# Patient Record
Sex: MALE | Race: WHITE | Employment: FULL TIME
[De-identification: names, ages, dates, MRNs, and addresses within clinical notes are randomized per-mention and may not be internally consistent; named-entity substitution may affect disease eponyms.]

---

## 2017-02-08 ENCOUNTER — TELEPHONE (OUTPATIENT)
Dept: FAMILY MEDICINE CLINIC | Facility: CLINIC | Age: 42
End: 2017-02-08

## 2017-02-08 RX ORDER — OSELTAMIVIR PHOSPHATE 75 MG/1
75 CAPSULE ORAL DAILY
Qty: 10 CAPSULE | Refills: 0 | Status: SHIPPED | OUTPATIENT
Start: 2017-02-08 | End: 2017-02-18

## 2019-05-29 ENCOUNTER — OFFICE VISIT (OUTPATIENT)
Dept: FAMILY MEDICINE CLINIC | Age: 44
End: 2019-05-29
Payer: COMMERCIAL

## 2019-05-29 VITALS
OXYGEN SATURATION: 96 % | TEMPERATURE: 99 F | SYSTOLIC BLOOD PRESSURE: 126 MMHG | BODY MASS INDEX: 25.87 KG/M2 | WEIGHT: 191 LBS | HEIGHT: 72 IN | DIASTOLIC BLOOD PRESSURE: 80 MMHG | HEART RATE: 88 BPM

## 2019-05-29 DIAGNOSIS — H65.92 LEFT NON-SUPPURATIVE OTITIS MEDIA: Primary | ICD-10-CM

## 2019-05-29 PROCEDURE — 99213 OFFICE O/P EST LOW 20 MIN: CPT | Performed by: FAMILY MEDICINE

## 2019-05-29 RX ORDER — CEPHALEXIN 500 MG/1
500 CAPSULE ORAL 3 TIMES DAILY
Qty: 30 CAPSULE | Refills: 0 | Status: SHIPPED | OUTPATIENT
Start: 2019-05-29 | End: 2019-06-08

## 2019-05-29 ASSESSMENT — ENCOUNTER SYMPTOMS
SORE THROAT: 1
SINUS PRESSURE: 1
COUGH: 1
RHINORRHEA: 1

## 2019-05-29 NOTE — PATIENT INSTRUCTIONS
SURVEY:    You may be receiving a survey from [employer] regarding your visit today. Please complete the survey to enable us to provide the highest quality of care to you and your family. If you cannot score us a very good on any question, please call the office to discuss how we could have made your experience a very good one. Thank you.

## 2019-05-29 NOTE — PROGRESS NOTES
Cardiovascular: Normal rate and regular rhythm. Pulmonary/Chest: Effort normal and breath sounds normal. He has no rales. Lymphadenopathy:     He has no cervical adenopathy. Neurological: He is alert and oriented to person, place, and time. Skin: Skin is warm and dry. Nursing note and vitals reviewed.        /80   Pulse 88   Temp 99 °F (37.2 °C) (Oral)   Ht 6' (1.829 m)   Wt 191 lb (86.6 kg)   SpO2 96%   BMI 25.90 kg/m²     RECENT LABS:  Lab Results   Component Value Date     06/27/2014    K 4.1 06/27/2014     06/27/2014    CO2 28 06/27/2014    BUN 15 06/27/2014    CREATININE 0.91 06/27/2014    GLUCOSE 94 06/27/2014    PROT 7.1 06/27/2014    LABALBU 4.4 06/27/2014    BILITOT 1.72 06/27/2014    ALKPHOS 69 06/27/2014    AST 19 06/27/2014    ALT 25 06/27/2014     No results found for: WBC, RBC, HGB, HCT, MCV, MCH, MCHC, RDW, PLT, MPV  No results found for: TSH  Lab Results   Component Value Date    CHOL 210 06/27/2014    HDL 63 06/27/2014       Assessment & Plan:  Left otitis media    Cephalexin 500 tid for 10 days    Electronically signed by Emerald Tobar DO on 5/29/2019 at 3:59 PM

## 2020-11-30 ENCOUNTER — TELEPHONE (OUTPATIENT)
Dept: FAMILY MEDICINE CLINIC | Age: 45
End: 2020-11-30

## 2020-11-30 ENCOUNTER — HOSPITAL ENCOUNTER (OUTPATIENT)
Dept: PREADMISSION TESTING | Age: 45
Setting detail: SPECIMEN
Discharge: HOME | End: 2020-11-30
Payer: COMMERCIAL

## 2020-11-30 LAB
SARS-COV-2, RAPID: DETECTED
SARS-COV-2: ABNORMAL
SARS-COV-2: ABNORMAL
SOURCE: ABNORMAL

## 2020-11-30 PROCEDURE — C9803 HOPD COVID-19 SPEC COLLECT: HCPCS

## 2020-11-30 PROCEDURE — U0002 COVID-19 LAB TEST NON-CDC: HCPCS

## 2020-11-30 NOTE — TELEPHONE ENCOUNTER
Sx started Friday, wife is positive Covid,  Head cold, loss of smell, headache. Would like a rapid test for confirmation due being around people at [facility]. Last visit:  5/29/2019  Next Visit Date:  No future appointments. Last Med refill:    Medication List:  Prior to Admission medications    Medication Sig Start Date End Date Taking? Authorizing Provider   fluticasone (FLONASE) 50 MCG/ACT nasal spray 2 sprays by Nasal route daily 5/24/16   Ashly Real A Jump, DO   ibuprofen (ADVIL;MOTRIN) 200 MG tablet Take 200 mg by mouth every 6 hours as needed for Pain. Historical Provider, MD       Allergies:  Patient has no known allergies.     No results found for: LABA1C          ( goal A1C is < 7)   No results found for: LABMICR  LDL Cholesterol (mg/dL)   Date Value   06/27/2014 136 (H)       (goal LDL is <100)   AST (U/L)   Date Value   06/27/2014 19     ALT (U/L)   Date Value   06/27/2014 25     BUN (mg/dL)   Date Value   06/27/2014 15     BP Readings from Last 3 Encounters:   05/29/19 126/80   05/24/16 122/70   09/30/14 118/70          (goal 120/80)

## 2020-12-01 ENCOUNTER — TELEPHONE (OUTPATIENT)
Dept: ADMINISTRATIVE | Age: 45
End: 2020-12-01

## 2021-02-15 ENCOUNTER — HOSPITAL ENCOUNTER (OUTPATIENT)
Dept: GENERAL RADIOLOGY | Age: 46
Discharge: HOME | End: 2021-02-17
Payer: COMMERCIAL

## 2021-02-15 ENCOUNTER — OFFICE VISIT (OUTPATIENT)
Dept: FAMILY MEDICINE CLINIC | Age: 46
End: 2021-02-15
Payer: COMMERCIAL

## 2021-02-15 ENCOUNTER — HOSPITAL ENCOUNTER (OUTPATIENT)
Age: 46
Discharge: HOME | End: 2021-02-17
Payer: COMMERCIAL

## 2021-02-15 VITALS
OXYGEN SATURATION: 98 % | WEIGHT: 192 LBS | TEMPERATURE: 97.8 F | DIASTOLIC BLOOD PRESSURE: 80 MMHG | HEART RATE: 82 BPM | SYSTOLIC BLOOD PRESSURE: 126 MMHG | HEIGHT: 72 IN | BODY MASS INDEX: 26.01 KG/M2

## 2021-02-15 DIAGNOSIS — M54.16 ACUTE LUMBAR RADICULOPATHY: ICD-10-CM

## 2021-02-15 DIAGNOSIS — M54.16 ACUTE LUMBAR RADICULOPATHY: Primary | ICD-10-CM

## 2021-02-15 PROCEDURE — 99213 OFFICE O/P EST LOW 20 MIN: CPT | Performed by: FAMILY MEDICINE

## 2021-02-15 PROCEDURE — 72110 X-RAY EXAM L-2 SPINE 4/>VWS: CPT

## 2021-02-15 RX ORDER — PREDNISONE 20 MG/1
40 TABLET ORAL DAILY
Qty: 10 TABLET | Refills: 0 | Status: SHIPPED | OUTPATIENT
Start: 2021-02-15 | End: 2021-02-20

## 2021-02-15 SDOH — ECONOMIC STABILITY: TRANSPORTATION INSECURITY
IN THE PAST 12 MONTHS, HAS LACK OF TRANSPORTATION KEPT YOU FROM MEETINGS, WORK, OR FROM GETTING THINGS NEEDED FOR DAILY LIVING?: NOT ASKED

## 2021-02-15 SDOH — ECONOMIC STABILITY: FOOD INSECURITY: WITHIN THE PAST 12 MONTHS, YOU WORRIED THAT YOUR FOOD WOULD RUN OUT BEFORE YOU GOT MONEY TO BUY MORE.: NEVER TRUE

## 2021-02-15 SDOH — ECONOMIC STABILITY: INCOME INSECURITY: HOW HARD IS IT FOR YOU TO PAY FOR THE VERY BASICS LIKE FOOD, HOUSING, MEDICAL CARE, AND HEATING?: NOT HARD AT ALL

## 2021-02-15 ASSESSMENT — PATIENT HEALTH QUESTIONNAIRE - PHQ9
SUM OF ALL RESPONSES TO PHQ QUESTIONS 1-9: 0
1. LITTLE INTEREST OR PLEASURE IN DOING THINGS: 0
SUM OF ALL RESPONSES TO PHQ QUESTIONS 1-9: 0

## 2021-02-15 NOTE — PROGRESS NOTES
Name: Toro Montesinos  : 1975         Chief Complaint:     Chief Complaint   Patient presents with    Lower Back Pain     radiates through right buttock, down back of right leg, tingling and numbness in toes. states that he shoveled snow last week. pain worse today. using tylenol and ibuprofen prn       History of Present Illness:      Toro Montesinos is a 39 y.o.  male who presents with Lower Back Pain (radiates through right buttock, down back of right leg, tingling and numbness in toes. states that he shoveled snow last week. pain worse today. using tylenol and ibuprofen prn)      HPI    Patient complains of pain in the right low back radiating into the buttock and posterior right lower extremity. He has had several episodes of this over the years, but this is the worst that he has had. No major injury but does recall that he tweaked his back in some way a few days ago. Pain became a lot worse this morning. He had to leave work essentially right after he had gotten there. Pain is worse with sitting and better with standing. He did have some tingling in the right toes which was short-lived. No difficulty ambulating and pain is not worse with ambulation. No weakness. He took ibuprofen 200 mg this morning and then Tylenol about an hour ago. Medical History:     Patient Active Problem List   Diagnosis   (none) - all problems resolved or deleted       Medications:       Prior to Admission medications    Medication Sig Start Date End Date Taking? Authorizing Provider   predniSONE (DELTASONE) 20 MG tablet Take 2 tablets by mouth daily for 5 days 2/15/21 2/20/21 Yes Lulu Richardson, DO   fluticasone (FLONASE) 50 MCG/ACT nasal spray 2 sprays by Nasal route daily 16   [address] A Jump, DO   ibuprofen (ADVIL;MOTRIN) 200 MG tablet Take 200 mg by mouth every 6 hours as needed for Pain. Historical Provider, MD        Allergies:       Patient has no known allergies.     Review ofSystems: Positive and Negative as described in HPI    Review of Systems   Constitutional: Negative. Gastrointestinal: Negative. Genitourinary: Negative. Physical Exam:     Vitals:  /80   Pulse 82   Temp 97.8 °F (36.6 °C)   Ht 6' (1.829 m)   Wt 192 lb (87.1 kg)   SpO2 98%   BMI 26.04 kg/m²   Physical Exam  Constitutional:       Appearance: Normal appearance. He is not ill-appearing. Musculoskeletal:      Comments: Decreased lumbar lordosis. Standing throughout visit and appears uncomfortable. No tenderness to palpation in the lumbar spine, paraspinals, or right piriformis. Positive straight leg raise bilaterally. 5/5 strength in the lower extremities. Neurological:      Mental Status: He is alert. Assessment & Plan:        Diagnosis Orders   1. Acute lumbar radiculopathy  XR LUMBAR SPINE (MIN 4 VIEWS)   Patient has had similar intermittent pain over the years. Unfortunately suspect he may have herniated a disc, specifically L5-S1. Obtaining x-ray, prescribed prednisone course, and advised gentle exercises as below as tolerated. Follow-up if not improving and may need PT and/or MRI. Requested Prescriptions     Signed Prescriptions Disp Refills    predniSONE (DELTASONE) 20 MG tablet 10 tablet 0     Sig: Take 2 tablets by mouth daily for 5 days         Patient Instructions     SURVEY:    You may be receiving a survey from [employer] regarding your visit today. You may get this in the mail, through your MyChart or in your email. Please complete the survey to enable us to provide the highest quality of care to you and your family. If you cannot score us as very good ( 5 Stars) on any question, please feel free to call the office to discuss how we could have made your experience exceptional.     Thank you.     Clinical Care Team:  Dr. James Quezada, [address] [place] [address], [address] [place] [address] Team:  Lillie Yi [facility]    Patient Education        Sciatica: Exercises  Introduction  Here are some examples of typical rehabilitation exercises for your condition. Start each exercise slowly. Ease off the exercise if you start to have pain. Your doctor or physical therapist will tell you when you can start these exercises and which ones will work best for you. When you are not being active, find a comfortable position for rest. Some people are comfortable on the floor or a medium-firm bed with a small pillow under their head and another under their knees. Some people prefer to lie on their side with a pillow between their knees. Don't stay in one position for too long. Take short walks (10 to 20 minutes) every 2 to 3 hours. Avoid slopes, hills, and stairs until you feel better. Walk only distances you can manage without pain, especially leg pain. How to do the exercises  Back stretches   1. Get down on your hands and knees on the floor. 2. Relax your head and allow it to droop. Round your back up toward the ceiling until you feel a nice stretch in your upper, middle, and lower back. Hold this stretch for as long as it feels comfortable, or about 15 to 30 seconds. 3. Return to the starting position with a flat back while you are on your hands and knees. 4. Let your back sway by pressing your stomach toward the floor. Lift your buttocks toward the ceiling. 5. Hold this position for 15 to 30 seconds. 6. Repeat 2 to 4 times. Follow-up care is a key part of your treatment and safety. Be sure to make and go to all appointments, and call your doctor if you are having problems. It's also a good idea to know your test results and keep a list of the medicines you take. Where can you learn more?

## 2021-02-15 NOTE — PATIENT INSTRUCTIONS
4. Let your back sway by pressing your stomach toward the floor. Lift your buttocks toward the ceiling. 5. Hold this position for 15 to 30 seconds. 6. Repeat 2 to 4 times. Follow-up care is a key part of your treatment and safety. Be sure to make and go to all appointments, and call your doctor if you are having problems. It's also a good idea to know your test results and keep a list of the medicines you take. Where can you learn more? Go to https://[employer]BringShare. org and sign in to your [employer] account. Enter H297 in the Amind box to learn more about \"Sciatica: Exercises. \"     If you do not have an account, please click on the \"Sign Up Now\" link. Current as of: March 2, 2020               Content Version: 12.6  © 3315-1732 [employer]. Care instructions adapted under license by [facility] ([facility]). If you have questions about a medical condition or this instruction, always ask your healthcare professional. [address] any warranty or liability for your use of this information.

## 2021-02-16 ASSESSMENT — ENCOUNTER SYMPTOMS: GASTROINTESTINAL NEGATIVE: 1

## 2021-02-17 ENCOUNTER — TELEPHONE (OUTPATIENT)
Dept: FAMILY MEDICINE CLINIC | Age: 46
End: 2021-02-17

## 2021-02-17 DIAGNOSIS — M54.16 ACUTE LUMBAR RADICULOPATHY: Primary | ICD-10-CM

## 2021-02-17 NOTE — TELEPHONE ENCOUNTER
Notified, patient has not gone to work, having trouble walking still, calf is numb. Asking for off work until 2/22/21 for now, will have employer send fmla.

## 2021-02-17 NOTE — TELEPHONE ENCOUNTER
----- Message from Sakina Dupont DO sent at 2/17/2021  6:54 AM EST -----  Disc space narrowing at the lowest 2 levels of the lumbar spine. This is likely r/t his symptoms - may have disc herniation pushing on that nerve. On prednisone. Recommend starting PT.

## 2021-04-20 ENCOUNTER — HOSPITAL ENCOUNTER (OUTPATIENT)
Age: 46
Discharge: HOME | End: 2021-04-20
Payer: COMMERCIAL

## 2021-04-20 ENCOUNTER — OFFICE VISIT (OUTPATIENT)
Dept: FAMILY MEDICINE CLINIC | Age: 46
End: 2021-04-20
Payer: COMMERCIAL

## 2021-04-20 VITALS
SYSTOLIC BLOOD PRESSURE: 110 MMHG | WEIGHT: 192 LBS | HEART RATE: 97 BPM | DIASTOLIC BLOOD PRESSURE: 70 MMHG | HEIGHT: 72 IN | BODY MASS INDEX: 26.01 KG/M2 | OXYGEN SATURATION: 98 %

## 2021-04-20 DIAGNOSIS — M79.89 RIGHT LEG SWELLING: Primary | ICD-10-CM

## 2021-04-20 DIAGNOSIS — M79.89 RIGHT LEG SWELLING: ICD-10-CM

## 2021-04-20 DIAGNOSIS — K40.90 LEFT INGUINAL HERNIA: ICD-10-CM

## 2021-04-20 DIAGNOSIS — R20.0 RIGHT LEG NUMBNESS: ICD-10-CM

## 2021-04-20 DIAGNOSIS — M54.16 CHRONIC LUMBAR RADICULOPATHY: ICD-10-CM

## 2021-04-20 LAB — D-DIMER QUANTITATIVE: <0.27 MG/L FEU (ref 0–0.59)

## 2021-04-20 PROCEDURE — 99214 OFFICE O/P EST MOD 30 MIN: CPT | Performed by: FAMILY MEDICINE

## 2021-04-20 PROCEDURE — 36415 COLL VENOUS BLD VENIPUNCTURE: CPT

## 2021-04-20 PROCEDURE — 85379 FIBRIN DEGRADATION QUANT: CPT

## 2021-04-20 NOTE — PROGRESS NOTES
described in HPI    Review of Systems    Physical Exam:     Vitals:  /70   Pulse 97   Ht 6' (1.829 m)   Wt 192 lb (87.1 kg)   SpO2 98%   BMI 26.04 kg/m²   Physical Exam  Constitutional:       Appearance: Normal appearance. He is not ill-appearing. Cardiovascular:      Comments: Right foot DP and PT pulses 2+, brisk capillary refill. No pitting edema. Prominent, dilated small caliber varicose veins present in the distal anterior leg as well as the posterior lateral ankle. The findings on the ankle are symmetric to those on the left ankle. No palpable venous cords in the calf. Abdominal:      Palpations: Abdomen is soft. Tenderness: There is no abdominal tenderness. Hernia: A hernia (Left inguinal hernia palpable at the inguinal ring, self reduces with lying supine at rest.  Nontender.) is present. Musculoskeletal:      Comments: Spinal curves within normal limits. Negative straight leg raise test.  Normal gait. Skin:     Comments: No skin lesions or erythema on the right lower extremity   Psychiatric:         Mood and Affect: Mood normal.         Behavior: Behavior normal.         Data:     2/15/2021 lumbar x-ray     FINDINGS: Moderate degenerative disc space narrowing L4-L5 and L5-S1. No    apparent pars defects. No fracture, lytic, or blastic lesion. Assessment & Plan:        Diagnosis Orders   1. Right leg swelling  D-Dimer, Quantitative   2. Right leg numbness  MRI LUMBAR SPINE WO CONTRAST   3. Chronic lumbar radiculopathy  MRI LUMBAR SPINE WO CONTRAST   4. Left inguinal hernia     Ongoing lumbar radiculopathy causing numbness of the right side of the foot. Also some neuromuscular symptoms with walking, cramping of the calf muscle. Strongly suspect S1 radiculopathy. X-ray findings as above, which would support suspected diagnosis. MRI ordered as patient has failed home exercises, prednisone, and again does have the neurologic symptoms.   New onset of right leg swelling, occurred after a trip and I suspect that he is having some dependent edema related to venous insufficiency. D-dimer ordered to assess risk of DVT. Advised he will need an ultrasound of this is positive. Left inguinal hernia not bothering him, monitor. Requested Prescriptions      No prescriptions requested or ordered in this encounter         Patient Instructions   SURVEY:    You may be receiving a survey from [employer] regarding your visit today. You may get this in the mail, through your MyChart or in your email. Please complete the survey to enable us to provide the highest quality of care to you and your family. If you cannot score us as very good ( 5 Stars) on any question, please feel free to call the office to discuss how we could have made your experience exceptional.     Thank you. Clinical Care Team:  Dr. Leo Funes DO                                           [facility], [address] [place] [address] Team:  Beena Collins received counseling on the following healthy behaviors: nutrition  Reviewed prior labs and health maintenance. Continue current medications, diet and exercise. Discussed use, benefit, and side effects of prescribed medications. Barriers to medication compliance addressed. Patient given educational materials - see patient instructions. All patient questions answered. Patient voiced understanding.        Electronically signed by Leo Funes DO on 4/21/2021 at 12:13 PM  [address]  [address]  Dept: [phone]

## 2021-04-20 NOTE — PATIENT INSTRUCTIONS
SURVEY:    You may be receiving a survey from [employer] regarding your visit today. You may get this in the mail, through your MyChart or in your email. Please complete the survey to enable us to provide the highest quality of care to you and your family. If you cannot score us as very good ( 5 Stars) on any question, please feel free to call the office to discuss how we could have made your experience exceptional.     Thank you.     Clinical Care Team:  Dr. Nicholas Singleton, DO Halina Dial, [address] [place] [address] Team:  [address] [place] [address]

## 2021-04-21 PROBLEM — M54.16 CHRONIC LUMBAR RADICULOPATHY: Status: ACTIVE | Noted: 2021-04-21

## 2021-04-30 ENCOUNTER — TELEPHONE (OUTPATIENT)
Dept: FAMILY MEDICINE CLINIC | Age: 46
End: 2021-04-30

## 2021-04-30 ENCOUNTER — HOSPITAL ENCOUNTER (OUTPATIENT)
Dept: MRI IMAGING | Age: 46
Discharge: HOME | End: 2021-05-02
Payer: COMMERCIAL

## 2021-04-30 DIAGNOSIS — R20.0 RIGHT LEG NUMBNESS: ICD-10-CM

## 2021-04-30 DIAGNOSIS — M54.16 CHRONIC LUMBAR RADICULOPATHY: ICD-10-CM

## 2021-04-30 PROCEDURE — 72148 MRI LUMBAR SPINE W/O DYE: CPT

## 2021-04-30 NOTE — LETTER
[facility] PRIMARY CARE [place] [address]  Phone: [phone]  Fax: Jose 106, DO        April 30, 2021     Patient: Angelo Ray   YOB: 1975   Date of Visit: 4/30/2021       To Whom It May Concern: It is my medical opinion that Marlena Villegas may return to work on 5/1/21. Excuse him 4/30/21. .    If you have any questions or concerns, please don't hesitate to call.     Sincerely,        Roxana Segovia DO

## 2022-03-15 ENCOUNTER — OFFICE VISIT (OUTPATIENT)
Dept: FAMILY MEDICINE CLINIC | Age: 47
End: 2022-03-15
Payer: COMMERCIAL

## 2022-03-15 VITALS
HEIGHT: 72 IN | DIASTOLIC BLOOD PRESSURE: 82 MMHG | SYSTOLIC BLOOD PRESSURE: 120 MMHG | HEART RATE: 89 BPM | OXYGEN SATURATION: 99 % | BODY MASS INDEX: 25.33 KG/M2 | WEIGHT: 187 LBS

## 2022-03-15 DIAGNOSIS — K40.90 LEFT INGUINAL HERNIA: Primary | ICD-10-CM

## 2022-03-15 PROCEDURE — 99213 OFFICE O/P EST LOW 20 MIN: CPT | Performed by: FAMILY MEDICINE

## 2022-03-15 SDOH — ECONOMIC STABILITY: FOOD INSECURITY: WITHIN THE PAST 12 MONTHS, THE FOOD YOU BOUGHT JUST DIDN'T LAST AND YOU DIDN'T HAVE MONEY TO GET MORE.: NEVER TRUE

## 2022-03-15 SDOH — ECONOMIC STABILITY: FOOD INSECURITY: WITHIN THE PAST 12 MONTHS, YOU WORRIED THAT YOUR FOOD WOULD RUN OUT BEFORE YOU GOT MONEY TO BUY MORE.: NEVER TRUE

## 2022-03-15 ASSESSMENT — PATIENT HEALTH QUESTIONNAIRE - PHQ9
2. FEELING DOWN, DEPRESSED OR HOPELESS: 0
SUM OF ALL RESPONSES TO PHQ9 QUESTIONS 1 & 2: 0
SUM OF ALL RESPONSES TO PHQ QUESTIONS 1-9: 0
SUM OF ALL RESPONSES TO PHQ QUESTIONS 1-9: 0
1. LITTLE INTEREST OR PLEASURE IN DOING THINGS: 0
SUM OF ALL RESPONSES TO PHQ QUESTIONS 1-9: 0
SUM OF ALL RESPONSES TO PHQ QUESTIONS 1-9: 0

## 2022-03-15 ASSESSMENT — SOCIAL DETERMINANTS OF HEALTH (SDOH): HOW HARD IS IT FOR YOU TO PAY FOR THE VERY BASICS LIKE FOOD, HOUSING, MEDICAL CARE, AND HEATING?: NOT HARD AT ALL

## 2022-03-15 NOTE — PROGRESS NOTES
Name: Tala Min  : 1975         Chief Complaint:     Chief Complaint   Patient presents with    Groin Pain     bulge left groin 1 year       History of Present Illness:      Tala Min is a 55 y.o.  male who presents with Groin Pain (bulge left groin 1 year)      HPI    Patient presents complaining of worsening of left inguinal hernia. Has been present for at least a year. He does get pain in the area and also notices loud bowel sounds, gas pains. Area seems to be more swollen sometimes than others but he believes it is always \"out,\" does not seem to completely reduce. It does not exactly bother him with lifting, just seems to have good days and bad days. It does bother him enough that he is interested in surgical repair. No difficulty with bowel movements. Feeling well otherwise. Medical History:     Patient Active Problem List   Diagnosis    Chronic lumbar radiculopathy       Medications:       Prior to Admission medications    Medication Sig Start Date End Date Taking? Authorizing Provider   fluticasone (FLONASE) 50 MCG/ACT nasal spray 2 sprays by Nasal route daily 16   Skyla Tobar, DO   ibuprofen (ADVIL;MOTRIN) 200 MG tablet Take 200 mg by mouth every 6 hours as needed for Pain. Historical Provider, MD        Allergies:       Patient has no known allergies. Physical Exam:     Vitals:  /82   Pulse 89   Ht 6' (1.829 m)   Wt 187 lb (84.8 kg)   SpO2 99%   BMI 25.36 kg/m²   Physical Exam  Constitutional:       Appearance: Normal appearance. Abdominal:      Hernia: A hernia (Tender left inguinal hernia) is present. Neurological:      Mental Status: He is alert. Assessment & Plan:        Diagnosis Orders   1. Left inguinal hernia  External Referral To General Surgery     Worsening and causing pain. Referred for consideration of surgical repair. May continue activity as tolerated in the meantime.  Advised that if he develops severe unrelenting pain or cessation of bowel movements, he needs to be seen urgently.        signed by Rajeev Wu DO on 3/15/2022 at 4:42 PM  [address]  Dept: [phone]

## 2022-04-27 ENCOUNTER — TELEPHONE (OUTPATIENT)
Dept: FAMILY MEDICINE CLINIC | Age: 47
End: 2022-04-27

## 2022-04-27 NOTE — LETTER
[facility] PRIMARY CARE [address] [address]  Phone: [phone]  Fax: [address], IV        April 27, 2022     Patient: Alena Marroquin   YOB: 1975   Date of Visit: 4/27/2022       To Whom It May Concern: It is my medical opinion that  Luis may return to work on 5/2/22 without restrictions. If you have any questions or concerns, please don't hesitate to call.     Sincerely,        Yair Virgen, DO

## 2024-06-17 ENCOUNTER — OFFICE VISIT (OUTPATIENT)
Dept: FAMILY MEDICINE CLINIC | Age: 49
End: 2024-06-17
Payer: COMMERCIAL

## 2024-06-17 VITALS
BODY MASS INDEX: 26.28 KG/M2 | DIASTOLIC BLOOD PRESSURE: 78 MMHG | WEIGHT: 194 LBS | OXYGEN SATURATION: 98 % | SYSTOLIC BLOOD PRESSURE: 138 MMHG | HEIGHT: 72 IN | HEART RATE: 90 BPM

## 2024-06-17 DIAGNOSIS — R22.1 NECK MASS: Primary | ICD-10-CM

## 2024-06-17 PROCEDURE — 99213 OFFICE O/P EST LOW 20 MIN: CPT | Performed by: FAMILY MEDICINE

## 2024-06-17 SDOH — ECONOMIC STABILITY: FOOD INSECURITY: WITHIN THE PAST 12 MONTHS, THE FOOD YOU BOUGHT JUST DIDN'T LAST AND YOU DIDN'T HAVE MONEY TO GET MORE.: NEVER TRUE

## 2024-06-17 SDOH — ECONOMIC STABILITY: HOUSING INSECURITY
IN THE LAST 12 MONTHS, WAS THERE A TIME WHEN YOU DID NOT HAVE A STEADY PLACE TO SLEEP OR SLEPT IN A SHELTER (INCLUDING NOW)?: NO

## 2024-06-17 SDOH — ECONOMIC STABILITY: FOOD INSECURITY: WITHIN THE PAST 12 MONTHS, YOU WORRIED THAT YOUR FOOD WOULD RUN OUT BEFORE YOU GOT MONEY TO BUY MORE.: NEVER TRUE

## 2024-06-17 SDOH — ECONOMIC STABILITY: INCOME INSECURITY: HOW HARD IS IT FOR YOU TO PAY FOR THE VERY BASICS LIKE FOOD, HOUSING, MEDICAL CARE, AND HEATING?: NOT VERY HARD

## 2024-06-17 ASSESSMENT — PATIENT HEALTH QUESTIONNAIRE - PHQ9
SUM OF ALL RESPONSES TO PHQ QUESTIONS 1-9: 0
2. FEELING DOWN, DEPRESSED OR HOPELESS: NOT AT ALL
SUM OF ALL RESPONSES TO PHQ QUESTIONS 1-9: 0
1. LITTLE INTEREST OR PLEASURE IN DOING THINGS: NOT AT ALL
SUM OF ALL RESPONSES TO PHQ9 QUESTIONS 1 & 2: 0

## 2024-06-17 NOTE — PROGRESS NOTES
Name: Maxim Eid  : 1975         Chief Complaint:     Chief Complaint   Patient presents with    Bump      Pt states he's had a small \"lump\" on the side of his head behind his right ear that's been present x 1 year. Denies it being painful/itchy/red        History of Present Illness:      Maxim Eid is a 48 y.o.  male who presents with Bump  (Pt states he's had a small \"lump\" on the side of his head behind his right ear that's been present x 1 year. Denies it being painful/itchy/red )      HPI    Pt c/o bump on R head posterior to ear for quite a while, estimates maybe a yr. Not painful or itchy. Doesn't recall any illness or injury prior to onset. No similar elsewhere. Feeling well, doing normal activities.     Medical History:     Patient Active Problem List   Diagnosis    Chronic lumbar radiculopathy       Medications:       Prior to Admission medications    Medication Sig Start Date End Date Taking? Authorizing Provider   fluticasone (FLONASE) 50 MCG/ACT nasal spray 2 sprays by Nasal route daily 16  Yes Frandy Tobar DO   ibuprofen (ADVIL;MOTRIN) 200 MG tablet Take 200 mg by mouth every 6 hours as needed for Pain.    Provider, MD Chao        Allergies:       Patient has no known allergies.    Physical Exam:     Vitals:  /78 (Site: Left Upper Arm, Position: Sitting)   Pulse 90   Ht 1.829 m (6')   Wt 88 kg (194 lb)   SpO2 98%   BMI 26.31 kg/m²   Physical Exam  Vitals and nursing note reviewed.   Constitutional:       General: He is not in acute distress.     Appearance: He is well-developed.   HENT:      Right Ear: Tympanic membrane and ear canal normal.      Left Ear: Tympanic membrane and ear canal normal.      Nose: Nose normal.      Mouth/Throat:      Mouth: Mucous membranes are moist.      Pharynx: Oropharynx is clear.   Eyes:      Conjunctiva/sclera: Conjunctivae normal.   Neck:      Comments: Subcu mass R posterolateral neck approx 1-1.5 cm diameter, smooth,

## 2024-06-26 ENCOUNTER — HOSPITAL ENCOUNTER (OUTPATIENT)
Dept: ULTRASOUND IMAGING | Age: 49
Discharge: HOME | End: 2024-06-28
Attending: FAMILY MEDICINE
Payer: COMMERCIAL

## 2024-06-26 DIAGNOSIS — R22.1 NECK MASS: ICD-10-CM

## 2024-06-26 PROCEDURE — 76536 US EXAM OF HEAD AND NECK: CPT

## 2024-08-27 ENCOUNTER — OFFICE VISIT (OUTPATIENT)
Dept: FAMILY MEDICINE CLINIC | Age: 49
End: 2024-08-27
Payer: COMMERCIAL

## 2024-08-27 VITALS
SYSTOLIC BLOOD PRESSURE: 138 MMHG | DIASTOLIC BLOOD PRESSURE: 84 MMHG | OXYGEN SATURATION: 97 % | BODY MASS INDEX: 26.28 KG/M2 | HEART RATE: 78 BPM | WEIGHT: 194 LBS | HEIGHT: 72 IN

## 2024-08-27 DIAGNOSIS — Z12.11 SCREENING FOR COLORECTAL CANCER: ICD-10-CM

## 2024-08-27 DIAGNOSIS — Z12.12 SCREENING FOR COLORECTAL CANCER: ICD-10-CM

## 2024-08-27 DIAGNOSIS — Z00.00 ROUTINE HEALTH MAINTENANCE: Primary | ICD-10-CM

## 2024-08-27 DIAGNOSIS — Z13.6 SCREENING FOR CARDIOVASCULAR CONDITION: ICD-10-CM

## 2024-08-27 PROCEDURE — 99396 PREV VISIT EST AGE 40-64: CPT | Performed by: FAMILY MEDICINE

## 2024-08-27 ASSESSMENT — PATIENT HEALTH QUESTIONNAIRE - PHQ9
SUM OF ALL RESPONSES TO PHQ9 QUESTIONS 1 & 2: 0
SUM OF ALL RESPONSES TO PHQ QUESTIONS 1-9: 0
1. LITTLE INTEREST OR PLEASURE IN DOING THINGS: NOT AT ALL
2. FEELING DOWN, DEPRESSED OR HOPELESS: NOT AT ALL
SUM OF ALL RESPONSES TO PHQ QUESTIONS 1-9: 0

## 2024-08-27 ASSESSMENT — ENCOUNTER SYMPTOMS
EYES NEGATIVE: 1
GASTROINTESTINAL NEGATIVE: 1
RESPIRATORY NEGATIVE: 1

## 2024-08-27 NOTE — PROGRESS NOTES
Name: Maxim Eid  : 1975         Chief Complaint:     Chief Complaint   Patient presents with    Health Maintenance       History of Present Illness:      Maxim Eid is a 49 y.o.  male who presents with Health Maintenance      [facility], no complaints, doing very well. Family h/o breast cancer in mom (still living), CAD in dad (approx mid 60s, also still living). Pt a nonsmoker.    Past Medical History:     No past medical history on file.     Past Surgical History:     Past Surgical History:   Procedure Laterality Date    BLADDER SURGERY          Medications:       Prior to Admission medications    Medication Sig Start Date End Date Taking? Authorizing Provider   fluticasone (FLONASE) 50 MCG/ACT nasal spray 2 sprays by Nasal route daily 16   Frandy Tobar DO   ibuprofen (ADVIL;MOTRIN) 200 MG tablet Take 200 mg by mouth every 6 hours as needed for Pain.    Provider, Historical, MD        Allergies:       Patient has no known allergies.    Social History:     Tobacco:    reports that he has never smoked. He has quit using smokeless tobacco.  His smokeless tobacco use included chew.  Alcohol:      reports current alcohol use.  Drug Use:  reports no history of drug use.    Family History:     Family History   Problem Relation Age of Onset    Cancer Mother         breast       Review of Systems:     Positive and Negative as described in HPI    Review of Systems   Constitutional: Negative.    HENT: Negative.     Eyes: Negative.    Respiratory: Negative.     Cardiovascular: Negative.    Gastrointestinal: Negative.    Genitourinary: Negative.    Musculoskeletal: Negative.    Skin: Negative.    Neurological: Negative.    Hematological: Negative.    Psychiatric/Behavioral: Negative.         Physical Exam:     Vitals:  /84   Pulse 78   Ht 1.829 m (6' 0.01\")   Wt 88 kg (194 lb)   SpO2 97%   BMI 26.31 kg/m²   Physical Exam  Vitals and nursing note reviewed.   Constitutional:

## 2024-09-06 ENCOUNTER — HOSPITAL ENCOUNTER (OUTPATIENT)
Age: 49
Discharge: HOME | End: 2024-09-06
Payer: COMMERCIAL

## 2024-09-06 DIAGNOSIS — Z13.6 SCREENING FOR CARDIOVASCULAR CONDITION: ICD-10-CM

## 2024-09-06 LAB
ALBUMIN SERPL-MCNC: 4.3 G/DL (ref 3.5–5.2)
ALP SERPL-CCNC: 88 U/L (ref 40–129)
ALT SERPL-CCNC: 32 U/L (ref 5–41)
ANION GAP SERPL CALCULATED.3IONS-SCNC: 10 MMOL/L (ref 9–17)
AST SERPL-CCNC: 23 U/L
BILIRUB SERPL-MCNC: 1.3 MG/DL (ref 0.3–1.2)
BUN SERPL-MCNC: 16 MG/DL (ref 6–20)
BUN/CREAT SERPL: 18 (ref 9–20)
CALCIUM SERPL-MCNC: 10.4 MG/DL (ref 8.6–10.4)
CHLORIDE SERPL-SCNC: 103 MMOL/L (ref 98–107)
CHOLEST SERPL-MCNC: 254 MG/DL (ref 0–199)
CHOLESTEROL/HDL RATIO: 5
CO2 SERPL-SCNC: 25 MMOL/L (ref 20–31)
CREAT SERPL-MCNC: 0.9 MG/DL (ref 0.7–1.2)
GFR, ESTIMATED: >90 ML/MIN/1.73M2
GLUCOSE SERPL-MCNC: 101 MG/DL (ref 70–99)
HDLC SERPL-MCNC: 54 MG/DL
LDLC SERPL CALC-MCNC: 184 MG/DL (ref 0–100)
POTASSIUM SERPL-SCNC: 4.4 MMOL/L (ref 3.7–5.3)
PROT SERPL-MCNC: 7.1 G/DL (ref 6.4–8.3)
SODIUM SERPL-SCNC: 138 MMOL/L (ref 135–144)
TRIGL SERPL-MCNC: 83 MG/DL
VLDLC SERPL CALC-MCNC: 17 MG/DL

## 2024-09-06 PROCEDURE — 80061 LIPID PANEL: CPT

## 2024-09-06 PROCEDURE — 80053 COMPREHEN METABOLIC PANEL: CPT

## 2024-09-06 PROCEDURE — 36415 COLL VENOUS BLD VENIPUNCTURE: CPT

## 2024-09-09 ENCOUNTER — TELEPHONE (OUTPATIENT)
Dept: FAMILY MEDICINE CLINIC | Age: 49
End: 2024-09-09

## 2024-10-24 LAB — NONINV COLON CA DNA+OCC BLD SCRN STL QL: NEGATIVE

## 2025-02-07 ENCOUNTER — TELEPHONE (OUTPATIENT)
Dept: FAMILY MEDICINE CLINIC | Age: 50
End: 2025-02-07

## 2025-02-07 DIAGNOSIS — D17.0 LIPOMA OF NECK: Primary | ICD-10-CM

## 2025-02-26 NOTE — TELEPHONE ENCOUNTER
Refer to Dr. Yee for neck mass            Last visit:  8/27/2024  Next Visit Date:  No future appointments.      Medication List:  Prior to Admission medications    Medication Sig Start Date End Date Taking? Authorizing Provider   fluticasone (FLONASE) 50 MCG/ACT nasal spray 2 sprays by Nasal route daily 5/24/16   Frandy Tobar DO   ibuprofen (ADVIL;MOTRIN) 200 MG tablet Take 200 mg by mouth every 6 hours as needed for Pain.    Provider, MD Chao                Quality 226: Preventive Care And Screening: Tobacco Use: Screening And Cessation Intervention: Patient screened for tobacco use and is an ex/non-smoker Detail Level: Detailed